# Patient Record
Sex: MALE | Race: ASIAN | Employment: FULL TIME | URBAN - METROPOLITAN AREA
[De-identification: names, ages, dates, MRNs, and addresses within clinical notes are randomized per-mention and may not be internally consistent; named-entity substitution may affect disease eponyms.]

---

## 2020-12-11 ENCOUNTER — APPOINTMENT (OUTPATIENT)
Dept: GENERAL RADIOLOGY | Facility: HOSPITAL | Age: 40
End: 2020-12-11
Attending: EMERGENCY MEDICINE
Payer: COMMERCIAL

## 2020-12-11 ENCOUNTER — HOSPITAL ENCOUNTER (EMERGENCY)
Facility: HOSPITAL | Age: 40
Discharge: LEFT BEFORE DISCHARGE | End: 2020-12-11
Payer: COMMERCIAL

## 2020-12-11 ENCOUNTER — HOSPITAL ENCOUNTER (EMERGENCY)
Facility: HOSPITAL | Age: 40
Discharge: HOME | End: 2020-12-11
Attending: EMERGENCY MEDICINE
Payer: COMMERCIAL

## 2020-12-11 VITALS
HEART RATE: 78 BPM | RESPIRATION RATE: 16 BRPM | DIASTOLIC BLOOD PRESSURE: 99 MMHG | SYSTOLIC BLOOD PRESSURE: 151 MMHG | OXYGEN SATURATION: 98 % | TEMPERATURE: 98 F

## 2020-12-11 VITALS
DIASTOLIC BLOOD PRESSURE: 95 MMHG | WEIGHT: 186.94 LBS | BODY MASS INDEX: 29 KG/M2 | HEART RATE: 88 BPM | SYSTOLIC BLOOD PRESSURE: 149 MMHG | OXYGEN SATURATION: 99 % | RESPIRATION RATE: 18 BRPM | TEMPERATURE: 98 F

## 2020-12-11 DIAGNOSIS — I10 HYPERTENSION, UNSPECIFIED TYPE: Primary | ICD-10-CM

## 2020-12-11 PROCEDURE — 36415 COLL VENOUS BLD VENIPUNCTURE: CPT

## 2020-12-11 PROCEDURE — 93005 ELECTROCARDIOGRAM TRACING: CPT

## 2020-12-11 PROCEDURE — 80053 COMPREHEN METABOLIC PANEL: CPT | Performed by: EMERGENCY MEDICINE

## 2020-12-11 PROCEDURE — 93010 ELECTROCARDIOGRAM REPORT: CPT

## 2020-12-11 PROCEDURE — 84484 ASSAY OF TROPONIN QUANT: CPT | Performed by: EMERGENCY MEDICINE

## 2020-12-11 PROCEDURE — 99285 EMERGENCY DEPT VISIT HI MDM: CPT

## 2020-12-11 PROCEDURE — 71045 X-RAY EXAM CHEST 1 VIEW: CPT | Performed by: EMERGENCY MEDICINE

## 2020-12-11 PROCEDURE — 99284 EMERGENCY DEPT VISIT MOD MDM: CPT

## 2020-12-11 PROCEDURE — 85025 COMPLETE CBC W/AUTO DIFF WBC: CPT | Performed by: EMERGENCY MEDICINE

## 2020-12-11 RX ORDER — AMLODIPINE BESYLATE 2.5 MG/1
2.5 TABLET ORAL DAILY
Qty: 30 TABLET | Refills: 0 | Status: SHIPPED | OUTPATIENT
Start: 2020-12-11 | End: 2021-01-10

## 2020-12-11 NOTE — ED INITIAL ASSESSMENT (HPI)
Pt went to see pcp, pt had bp 180/110, pt bp here is 149 / 95 , pt currently does not take bp meds, pt was instructed to go to er for high blood pressure, pt aox4, nad , skin warm and intact

## 2020-12-11 NOTE — ED INITIAL ASSESSMENT (HPI)
Pt presents with HTN that started rising since October. Pt denies CP/STEPHANIE.  Pt consulted with PCP and was instructed to come to ER

## 2020-12-12 NOTE — ED PROVIDER NOTES
Patient Seen in: [facility] Emergency Department      History   Patient presents with:  Hypertension    Stated Complaint: HTN, patient left and now back to be seen,     HPI    Patient is a 39-year-old male who presents for evaluation of hypertension SpO2 99 %   O2 Device None (Room air)       Current:BP (!) 151/99   Pulse 78   Temp 98 °F (36.7 °C) (Temporal)   Resp 16   SpO2 98%         Physical Exam  Vitals signs and nursing note reviewed. Constitutional:       Appearance: He is well-developed. noted on EKG Report. Rate: 84  Rhythm: Sinus Rhythm  Reading: No ST segment or T wave changes. No old available for comparison.            Xr Chest Ap Portable  (cpt=71045)    Result Date: 12/11/2020  PROCEDURE:  XR CHEST AP PORTABLE  (CPT=71045)  TECHNIQ Clinical Impression:  Hypertension, unspecified type  (primary encounter diagnosis)    Disposition:  Discharge  12/11/2020  8:33 pm    Follow-up:  Maykel Diallo [address]  [address]  [phone]